# Patient Record
Sex: FEMALE | HISPANIC OR LATINO | Employment: UNEMPLOYED | URBAN - METROPOLITAN AREA
[De-identification: names, ages, dates, MRNs, and addresses within clinical notes are randomized per-mention and may not be internally consistent; named-entity substitution may affect disease eponyms.]

---

## 2017-01-16 ENCOUNTER — HOSPITAL ENCOUNTER (EMERGENCY)
Facility: HOSPITAL | Age: 32
Discharge: HOME | End: 2017-01-16
Attending: EMERGENCY MEDICINE
Payer: MEDICAID

## 2017-01-16 VITALS
DIASTOLIC BLOOD PRESSURE: 61 MMHG | TEMPERATURE: 98 F | SYSTOLIC BLOOD PRESSURE: 107 MMHG | OXYGEN SATURATION: 100 % | BODY MASS INDEX: 23.05 KG/M2 | WEIGHT: 135 LBS | RESPIRATION RATE: 20 BRPM | HEIGHT: 64 IN | HEART RATE: 77 BPM

## 2017-01-16 DIAGNOSIS — R11.0 NAUSEA: ICD-10-CM

## 2017-01-16 DIAGNOSIS — Z34.91 FIRST TRIMESTER PREGNANCY: Primary | ICD-10-CM

## 2017-01-16 LAB
B-HCG UR QL: POSITIVE
CTP QC/QA: YES

## 2017-01-16 PROCEDURE — 81025 URINE PREGNANCY TEST: CPT | Performed by: EMERGENCY MEDICINE

## 2017-01-16 PROCEDURE — 99283 EMERGENCY DEPT VISIT LOW MDM: CPT | Mod: 25

## 2017-01-16 RX ORDER — ONDANSETRON 4 MG/1
8 TABLET, ORALLY DISINTEGRATING ORAL EVERY 8 HOURS PRN
Qty: 12 TABLET | Refills: 1 | Status: SHIPPED | OUTPATIENT
Start: 2017-01-16 | End: 2018-07-07

## 2017-01-16 NOTE — ED PROVIDER NOTES
Encounter Date: 1/16/2017    SCRIBE #1 NOTE: I, Andrew Kingston, am scribing for, and in the presence of,  Jamie Herrera MD. I have scribed the following portions of the note - Other sections scribed: HPI, ROS.       History     Chief Complaint   Patient presents with    Nausea     Sts has nausea and is one month pregnant. Denies abd pain or vag d/c.     Review of patient's allergies indicates:  No Known Allergies  HPI Comments: CC: Nausea secondary to pregnancy     HPI: This 31 y.o. Female with no pertinent PMHx presents to the ED c/o nausea. Pt states she is 1 month pregnant due to a positive at home pregnancy test but wants to confirm and verify how far along she is. Pt denies emesis, vaginal d/c, vaginal bleeding, lower back pain, abdominal pain or any other associated symptoms.     The history is provided by the patient and the spouse.     History reviewed. No pertinent past medical history.  Past Medical History Pertinent Negatives   Diagnosis Date Noted    Arthritis 1/16/2017    Asthma 1/16/2017    Cancer 1/16/2017    CHF (congestive heart failure) 1/16/2017    Depression 1/16/2017    Diabetes mellitus 1/16/2017    Encounter for blood transfusion 1/16/2017    GERD (gastroesophageal reflux disease) 1/16/2017    Hypertension 1/16/2017     Past Surgical History   Procedure Laterality Date    Breast implants       History reviewed. No pertinent family history.  Social History   Substance Use Topics    Smoking status: Never Smoker    Smokeless tobacco: None    Alcohol use Yes      Comment: sometimes     Review of Systems   Constitutional: Negative for fever.   HENT: Negative for sore throat.    Eyes: Negative for photophobia.   Respiratory: Negative for shortness of breath.    Cardiovascular: Negative for chest pain.   Gastrointestinal: Positive for nausea. Negative for vomiting.   Genitourinary: Negative for dysuria, vaginal bleeding and vaginal discharge.   Musculoskeletal: Negative for back pain.    Skin: Negative for rash.   Neurological: Negative for weakness.       Physical Exam   Initial Vitals   BP Pulse Resp Temp SpO2   01/16/17 1044 01/16/17 1044 01/16/17 1044 01/16/17 1044 01/16/17 1044   104/59 64 18 98.4 °F (36.9 °C) 100 %     Physical Exam    Nursing note and vitals reviewed.  HENT:   Head: Atraumatic.   Abdominal: Soft. She exhibits no distension. There is no tenderness. There is no rebound.   Neurological: She is alert and oriented to person, place, and time.   Psychiatric: She has a normal mood and affect.         ED Course   Procedures  Labs Reviewed   POCT URINE PREGNANCY - Abnormal; Notable for the following:        Result Value    POC Preg Test, Ur Positive (*)     All other components within normal limits             Medical Decision Making:   Initial Assessment:   31-year-old female presenting with nausea.  Patient says she had a positive pregnancy test at home.  Denies abdominal pain, vaginal bleeding or discharge.  Exam is unremarkable.  Abdomen benign.  UPT positive.  Patient will be discharged home with prenatal vitamins, Zofran and follow up with OB.  Return instructions given.  Patient verbalized understanding and agreement with plan.            Scribe Attestation:   Scribe #1: I performed the above scribed service and the documentation accurately describes the services I performed. I attest to the accuracy of the note.    Attending Attestation:           Physician Attestation for Scribe:  Physician Attestation Statement for Scribe #1: I, Jamie Herrera MD, reviewed documentation, as scribed by Andrew Kingston in my presence, and it is both accurate and complete.                 ED Course     Clinical Impression:   The primary encounter diagnosis was First trimester pregnancy. A diagnosis of Nausea was also pertinent to this visit.          Jamie Herrera MD  01/16/17 1274

## 2017-01-16 NOTE — ED AVS SNAPSHOT
[facility]  [place] [address]               Aamir Upton   2017 11:23 AM   ED    Description:  Female : 1985   Department:  [facility]           Your Care was Coordinated By:     Provider Role From To    Jamie Herrera MD Attending Provider 17 1123 --      Reason for Visit     Nausea           Diagnoses this Visit        Comments    First trimester pregnancy    -  Primary     Nausea           ED Disposition     ED Disposition Condition Comment    Discharge             To Do List           Follow-up Information     Follow up with Luis Fernando Portillo MD. Schedule an appointment as soon as possible for a visit in 1 week.    Specialty:  Obstetrics and Gynecology    Contact information:    [address]  [address]  [place] [address]  [phone]         These Medications        Disp Refills Start End    ondansetron (ZOFRAN-ODT) 4 MG TbDL 12 tablet 1 2017     Take 2 tablets (8 mg total) by mouth every 8 (eight) hours as needed. - Oral    PNV cmb#21-iron-folic acid (PRENATAL COMPLETE)  mg-mcg Tab 30 tablet 0 2017    Take 1 tablet by mouth once daily. - Oral      [facility]     [facility] -  Assistance  Registered nurses in the [facility] provide clinical advisement, health education, appointment booking, and other advisory services.  Call for this free service at [phone].             Medications           Message regarding Medications     Verify the changes and/or additions to your medication regime listed below are the same as discussed with your clinician today.  If any of these changes or additions are incorrect, please notify your healthcare provider.        START taking these NEW medications        Refills    ondansetron (ZOFRAN-ODT) 4 MG TbDL 1    Sig: Take 2 tablets (8 mg total) by mouth every 8 (eight) hours as needed.    Class: Print     "Route: Oral    PNV cmb#21-iron-folic acid (PRENATAL COMPLETE)  mg-mcg Tab 0    Sig: Take 1 tablet by mouth once daily.    Class: Print    Route: Oral           Verify that the below list of medications is an accurate representation of the medications you are currently taking.  If none reported, the list may be blank. If incorrect, please contact your healthcare provider. Carry this list with you in case of emergency.           Current Medications     ondansetron (ZOFRAN-ODT) 4 MG TbDL Take 2 tablets (8 mg total) by mouth every 8 (eight) hours as needed.    PNV cmb#21-iron-folic acid (PRENATAL COMPLETE)  mg-mcg Tab Take 1 tablet by mouth once daily.    PNV95/FERROUS FUMARATE/FA (PRENATAL ORAL) Take by mouth.           Clinical Reference Information           Your Vitals Were     BP Pulse Temp Resp Height Weight    104/59 (BP Location: Left arm, Patient Position: Sitting) 64 98.4 °F (36.9 °C) (Oral) 18 5' 4" (1.626 m) 61.2 kg (135 lb)    Last Period SpO2 BMI          11/28/2016 100% 23.17 kg/m2        Allergies as of 1/16/2017     No Known Allergies      Immunizations Administered on Date of Encounter - 1/16/2017     None      ED Micro, Lab, POCT     Start Ordered       Status Ordering Provider    01/16/17 1050 01/16/17 1049  POCT urine pregnancy  Once      Final result       ED Imaging Orders     None      Discharge References/Attachments     PREGNANCY: YOUR FIRST TRIMESTER CHANGES (ENGLISH)       [facility] complies with applicable Federal civil rights laws and does not discriminate on the basis of race, color, national origin, age, disability, or sex.        Language Assistance Services     ATTENTION: Language assistance services are available, free of charge. Please call [phone].      ATENCIÓN: Si habla español, tiene a dunlap disposición servicios gratuitos de asistencia lingüística. Llame al [phone].     CHÚ Ý: N?u b?n nói Ti?ng Vi?t, có các d?ch v? h? tr? ngôn ng? mi?n " phí dành cho b?keyanna. G?i s? [phone].

## 2017-03-13 ENCOUNTER — HOSPITAL ENCOUNTER (EMERGENCY)
Facility: HOSPITAL | Age: 32
Discharge: HOME | End: 2017-03-13
Attending: EMERGENCY MEDICINE
Payer: MEDICAID

## 2017-03-13 VITALS
OXYGEN SATURATION: 97 % | HEART RATE: 80 BPM | DIASTOLIC BLOOD PRESSURE: 62 MMHG | RESPIRATION RATE: 16 BRPM | HEIGHT: 65 IN | WEIGHT: 140 LBS | BODY MASS INDEX: 23.32 KG/M2 | TEMPERATURE: 98 F | SYSTOLIC BLOOD PRESSURE: 98 MMHG

## 2017-03-13 DIAGNOSIS — R56.9 SEIZURE-LIKE ACTIVITY: ICD-10-CM

## 2017-03-13 DIAGNOSIS — O99.891 BACTERIURIA IN PREGNANCY: ICD-10-CM

## 2017-03-13 DIAGNOSIS — F10.929 ALCOHOL INTOXICATION, WITH UNSPECIFIED COMPLICATION: Primary | ICD-10-CM

## 2017-03-13 DIAGNOSIS — R82.71 BACTERIURIA IN PREGNANCY: ICD-10-CM

## 2017-03-13 LAB
ALBUMIN SERPL BCP-MCNC: 3.5 G/DL
ALP SERPL-CCNC: 35 U/L
ALT SERPL W/O P-5'-P-CCNC: 11 U/L
AMPHET+METHAMPHET UR QL: NEGATIVE
ANION GAP SERPL CALC-SCNC: 9 MMOL/L
AST SERPL-CCNC: 18 U/L
BACTERIA #/AREA URNS HPF: ABNORMAL /HPF
BARBITURATES UR QL SCN>200 NG/ML: NEGATIVE
BASOPHILS # BLD AUTO: 0.03 K/UL
BASOPHILS NFR BLD: 0.4 %
BENZODIAZ UR QL SCN>200 NG/ML: NEGATIVE
BILIRUB SERPL-MCNC: 0.2 MG/DL
BILIRUB UR QL STRIP: NEGATIVE
BUN SERPL-MCNC: 6 MG/DL
BZE UR QL SCN: NEGATIVE
CALCIUM SERPL-MCNC: 8.5 MG/DL
CANNABINOIDS UR QL SCN: NEGATIVE
CHLORIDE SERPL-SCNC: 109 MMOL/L
CLARITY UR: ABNORMAL
CO2 SERPL-SCNC: 23 MMOL/L
COLOR UR: YELLOW
CREAT SERPL-MCNC: 0.6 MG/DL
CREAT UR-MCNC: 82.7 MG/DL
DIFFERENTIAL METHOD: ABNORMAL
EOSINOPHIL # BLD AUTO: 0 K/UL
EOSINOPHIL NFR BLD: 0.4 %
ERYTHROCYTE [DISTWIDTH] IN BLOOD BY AUTOMATED COUNT: 12.8 %
EST. GFR  (AFRICAN AMERICAN): >60 ML/MIN/1.73 M^2
EST. GFR  (NON AFRICAN AMERICAN): >60 ML/MIN/1.73 M^2
ETHANOL SERPL-MCNC: 109 MG/DL
GLUCOSE SERPL-MCNC: 95 MG/DL
GLUCOSE UR QL STRIP: NEGATIVE
HCT VFR BLD AUTO: 34.3 %
HGB BLD-MCNC: 11.9 G/DL
HGB UR QL STRIP: NEGATIVE
KETONES UR QL STRIP: NEGATIVE
LEUKOCYTE ESTERASE UR QL STRIP: NEGATIVE
LYMPHOCYTES # BLD AUTO: 1.2 K/UL
LYMPHOCYTES NFR BLD: 17.6 %
MCH RBC QN AUTO: 31.1 PG
MCHC RBC AUTO-ENTMCNC: 34.7 %
MCV RBC AUTO: 90 FL
METHADONE UR QL SCN>300 NG/ML: NEGATIVE
MICROSCOPIC COMMENT: ABNORMAL
MONOCYTES # BLD AUTO: 0.5 K/UL
MONOCYTES NFR BLD: 7.6 %
NEUTROPHILS # BLD AUTO: 5.1 K/UL
NEUTROPHILS NFR BLD: 73.9 %
NITRITE UR QL STRIP: NEGATIVE
OPIATES UR QL SCN: NEGATIVE
PCP UR QL SCN>25 NG/ML: NEGATIVE
PH UR STRIP: 6 [PH] (ref 5–8)
PLATELET # BLD AUTO: 226 K/UL
PMV BLD AUTO: 10 FL
POTASSIUM SERPL-SCNC: 4.3 MMOL/L
PROT SERPL-MCNC: 6.8 G/DL
PROT UR QL STRIP: NEGATIVE
RBC # BLD AUTO: 3.83 M/UL
SODIUM SERPL-SCNC: 141 MMOL/L
SP GR UR STRIP: 1.02 (ref 1–1.03)
SQUAMOUS #/AREA URNS HPF: 4 /HPF
TOXICOLOGY INFORMATION: NORMAL
URN SPEC COLLECT METH UR: ABNORMAL
UROBILINOGEN UR STRIP-ACNC: NEGATIVE EU/DL
WBC # BLD AUTO: 6.93 K/UL
WBC #/AREA URNS HPF: 1 /HPF (ref 0–5)

## 2017-03-13 PROCEDURE — 25000003 PHARM REV CODE 250: Performed by: EMERGENCY MEDICINE

## 2017-03-13 PROCEDURE — 80053 COMPREHEN METABOLIC PANEL: CPT

## 2017-03-13 PROCEDURE — 96361 HYDRATE IV INFUSION ADD-ON: CPT

## 2017-03-13 PROCEDURE — 87086 URINE CULTURE/COLONY COUNT: CPT

## 2017-03-13 PROCEDURE — 99284 EMERGENCY DEPT VISIT MOD MDM: CPT | Mod: 25

## 2017-03-13 PROCEDURE — 85025 COMPLETE CBC W/AUTO DIFF WBC: CPT

## 2017-03-13 PROCEDURE — 82570 ASSAY OF URINE CREATININE: CPT

## 2017-03-13 PROCEDURE — 80320 DRUG SCREEN QUANTALCOHOLS: CPT

## 2017-03-13 PROCEDURE — 81000 URINALYSIS NONAUTO W/SCOPE: CPT

## 2017-03-13 PROCEDURE — 96360 HYDRATION IV INFUSION INIT: CPT

## 2017-03-13 RX ORDER — NITROFURANTOIN 25; 75 MG/1; MG/1
100 CAPSULE ORAL ONCE
Status: COMPLETED | OUTPATIENT
Start: 2017-03-13 | End: 2017-03-13

## 2017-03-13 RX ORDER — NITROFURANTOIN 25; 75 MG/1; MG/1
100 CAPSULE ORAL 2 TIMES DAILY
Qty: 14 CAPSULE | Refills: 0 | Status: SHIPPED | OUTPATIENT
Start: 2017-03-13 | End: 2017-03-20

## 2017-03-13 RX ADMIN — SODIUM CHLORIDE 1000 ML: 0.9 INJECTION, SOLUTION INTRAVENOUS at 02:03

## 2017-03-13 RX ADMIN — SODIUM CHLORIDE 1000 ML: 0.9 INJECTION, SOLUTION INTRAVENOUS at 04:03

## 2017-03-13 RX ADMIN — NITROFURANTOIN MONOHYDRATE AND NITROFURANTOIN MACROCRYSTALLINE 100 MG: 75; 25 CAPSULE ORAL at 04:03

## 2017-03-13 NOTE — ED AVS SNAPSHOT
[facility]  [place] [address]               Aamir Upton   3/13/2017  1:16 AM   ED    Description:  Female : 1985   Department:  [facility]           Your Care was Coordinated By:     Provider Role From To    Prabhakar Puga MD Attending Provider 17 0122 --      Reason for Visit     Alcohol Intoxication           Diagnoses this Visit        Comments    Alcohol intoxication, with unspecified complication    -  Primary     Seizure-like activity         Bacteriuria in pregnancy           ED Disposition     None           To Do List           Follow-up Information     Follow up with Carmelo Tyler MD. Schedule an appointment as soon as possible for a visit today.    Specialty:  Neurology    Contact information:    [address]  [place] [address]  [phone]          Follow up with [facility].    Specialty:  Emergency Medicine    Why:  As needed    Contact information:    [place] [address] [phone]        Please follow up.    Why:  Also follow-up with her OB/GYN.       These Medications        Disp Refills Start End    nitrofurantoin, macrocrystal-monohydrate, (MACROBID) 100 MG capsule 14 capsule 0 3/13/2017 3/20/2017    Take 1 capsule (100 mg total) by mouth 2 (two) times daily. - Oral    Pharmacy: [facility] [address] [place] [address] [address] AT [facility] #: [phone]         [facility]     [facility] Care [facility] -  Assistance  Registered nurses in the [facility] provide clinical advisement, health education, appointment booking, and other advisory services.  Call for this free service at [phone].             Medications           Message regarding Medications     Verify the changes and/or additions to your medication regime listed below are the same as discussed with your  "clinician today.  If any of these changes or additions are incorrect, please notify your healthcare provider.        START taking these NEW medications        Refills    nitrofurantoin, macrocrystal-monohydrate, (MACROBID) 100 MG capsule 0    Sig: Take 1 capsule (100 mg total) by mouth 2 (two) times daily.    Class: Print    Route: Oral      These medications were administered today        Dose Freq    sodium chloride 0.9% bolus 1,000 mL 1,000 mL ED 1 Time    Sig: Inject 1,000 mLs into the vein ED 1 Time.    Class: Normal    Route: Intravenous    sodium chloride 0.9% bolus 1,000 mL 1,000 mL ED 1 Time    Sig: Inject 1,000 mLs into the vein ED 1 Time.    Class: Normal    Route: Intravenous    nitrofurantoin (macrocrystal-monohydrate) 100 MG capsule 100 mg 100 mg Once    Sig: Take 1 capsule (100 mg total) by mouth once.    Class: Normal    Route: Oral           Verify that the below list of medications is an accurate representation of the medications you are currently taking.  If none reported, the list may be blank. If incorrect, please contact your healthcare provider. Carry this list with you in case of emergency.           Current Medications     PNV with Ca,no.74-iron-FA (PRENAPLUS) 27-1 mg Tab Take 1 tablet by mouth once daily.    nitrofurantoin, macrocrystal-monohydrate, (MACROBID) 100 MG capsule Take 1 capsule (100 mg total) by mouth 2 (two) times daily.    ondansetron (ZOFRAN-ODT) 4 MG TbDL Take 2 tablets (8 mg total) by mouth every 8 (eight) hours as needed.    PNV cmb#21-iron-folic acid (PRENATAL COMPLETE)  mg-mcg Tab Take 1 tablet by mouth once daily.    promethazine (PHENERGAN) 25 MG tablet Take 1 tablet (25 mg total) by mouth every 6 (six) hours as needed for Nausea.           Clinical Reference Information           Your Vitals Were     BP Pulse Temp Resp Height Weight    95/52 66 98.3 °F (36.8 °C) 16 5' 5" (1.651 m) 63.5 kg (140 lb)    Last Period SpO2 BMI          11/28/2016 97% 23.3 kg/m2      "   Allergies as of 3/13/2017     No Known Allergies      Immunizations Administered on Date of Encounter - 3/13/2017     None      ED Micro, Lab, POCT     Start Ordered       Status Ordering Provider    03/13/17 0430 03/13/17 0430  Urine culture **CANNOT BE ORDERED STAT**  Once      In process     03/13/17 0208 03/13/17 0207  Ethanol  STAT      Final result     03/13/17 0208 03/13/17 0207  Urinalysis  STAT      Final result     03/13/17 0208 03/13/17 0207  Drug screen panel, emergency  STAT      Final result     03/13/17 0208 03/13/17 0207  CBC auto differential  Once      Final result     03/13/17 0208 03/13/17 0207  Comprehensive metabolic panel  Once      Final result     03/13/17 0207 03/13/17 0207  Urinalysis Microscopic  Once      Final result       ED Imaging Orders     None      Discharge References/Attachments     ALCOHOL INTOXICATION (ENGLISH)    SEIZURE, NEW ONSET, UNKNOWN CAUSE (ADULT) (ENGLISH)      Your Scheduled Appointments     Mar 15, 2017 11:15 AM CDT   Repeat OB Visit-OCC with Apoorva Ji MD   [facility] ([facility])    [address] [place] [address]   [phone]               [facility] complies with applicable Federal civil rights laws and does not discriminate on the basis of race, color, national origin, age, disability, or sex.        Language Assistance Services     ATTENTION: Language assistance services are available, free of charge. Please call [phone].      ATENCIÓN: Si habla español, tiene a dunlap disposición servicios gratuitos de asistencia lingüística. Llame al [phone].     CHÚ Ý: N?u b?n nói Ti?ng Vi?t, có các d?ch v? h? tr? ngôn ng? mi?n phí dành cho b?n. G?i s? [phone].

## 2017-03-13 NOTE — ED TRIAGE NOTES
"Patient is 15 weeks pregnant and presents with alcohol intoxication. Patient states that she went out with friends and "made a mistake." Patient states that she drank two large drinks with vodka. Patient states this is her first time drinking during her pregnancy. Patient appears clinically intoxicated at this time.   "

## 2017-03-13 NOTE — ED PROVIDER NOTES
Encounter Date: 3/13/2017    SCRIBE #1 NOTE: I, Maria D Morocho, am scribing for, and in the presence of,  Prabhakar Puga MD. I have scribed the following portions of the note - Other sections scribed: HPI, ROS.       History     Chief Complaint   Patient presents with    Alcohol Intoxication     PATIENT 15 WEEKS PREGNANT, GOT INTOXICATED TONIGHT. S.O. CALLED EMS BECAUSE PATIENT'S ARMS WERE SHAKING. A/A/O, VSS, NO S/S OF ACUTE DISTRESS NOTED.      Review of patient's allergies indicates:  No Known Allergies  HPI Comments: CC: Alcohol Intoxication    HPI: 31 year old pregnant female (13 weeks) with no known PMHx presents to the ED via EMS for emergent evaluation s/p alcohol intoxication. Patient reports drinking 1.5 bottles of wine yesterday after an argument with her .  says patient arrived home last night, and when he put her in bed she started shaking for about 30 seconds and was unresponsive. Patient attributes the shaking to being cold, but does not remember her  asking if she was okay. Patient states her next OB/GYN appointment is 3/15/17, and thinks she is 13 weeks pregnant, but is unsure. Patient denies a hx of drug use, hx of seizures, suicidal ideation, vomiting and other symptoms.          The history is provided by the patient and the spouse. No  was used.     History reviewed. No pertinent past medical history.  Past Surgical History:   Procedure Laterality Date    breast implants      BREAST SURGERY       Family History   Problem Relation Age of Onset    Breast cancer Maternal Aunt     Colon cancer Neg Hx     Ovarian cancer Neg Hx      Social History   Substance Use Topics    Smoking status: Never Smoker    Smokeless tobacco: None    Alcohol use Yes      Comment: sometimes     Review of Systems   Constitutional: Positive for chills.   HENT: Negative for rhinorrhea and sore throat.    Eyes: Negative for pain and visual disturbance.   Respiratory:  Negative for cough and shortness of breath.    Cardiovascular: Negative for chest pain.   Gastrointestinal: Negative for abdominal pain, diarrhea, nausea and vomiting.   Genitourinary: Negative for dysuria.   Musculoskeletal: Negative for back pain and neck pain.   Skin: Negative for rash.   Neurological: Negative for dizziness and headaches.   Psychiatric/Behavioral: Negative for suicidal ideas.       Physical Exam   Initial Vitals   BP Pulse Resp Temp SpO2   03/13/17 0105 03/13/17 0105 03/13/17 0105 03/13/17 0105 03/13/17 0105   112/88 80 16 98.3 °F (36.8 °C) 99 %     Physical Exam    ED Course   Procedures  Labs Reviewed   ALCOHOL,MEDICAL (ETHANOL) - Abnormal; Notable for the following:        Result Value    Alcohol, Medical, Serum 109 (*)     All other components within normal limits   URINALYSIS - Abnormal; Notable for the following:     Appearance, UA Hazy (*)     All other components within normal limits   CBC W/ AUTO DIFFERENTIAL - Abnormal; Notable for the following:     RBC 3.83 (*)     Hemoglobin 11.9 (*)     Hematocrit 34.3 (*)     MCH 31.1 (*)     Gran% 73.9 (*)     Lymph% 17.6 (*)     All other components within normal limits   COMPREHENSIVE METABOLIC PANEL - Abnormal; Notable for the following:     Calcium 8.5 (*)     Alkaline Phosphatase 35 (*)     All other components within normal limits   URINALYSIS MICROSCOPIC - Abnormal; Notable for the following:     Bacteria, UA Moderate (*)     All other components within normal limits   CULTURE, URINE   DRUG SCREEN PANEL, URINE EMERGENCY            patient reportedly had a argument with person a few other this morning.  Due to being upset she drank some wine.  She started to feel very cold and what felt like she was shivering.  Significant other states she was having seizure-like activity.  She feels she remembers the entire event.  He states there was a period time where she did not respond to him.  No history of seizure disorder.  No report of head  trauma.  No headache.  There was no biting of the tongue or loss of urine or bowel.  Occurred while patient was lying on the couch.  No trauma.  Patient states she now feels well.  Unclear whether this is actual seizure activity or not.  Patient has not been having headaches or any other neurologic problems.  Patient has a normal neurologic exam this time.  Normal vital signs except for lead pressure in the 90s over 50s.  Patient is a young woman who is relatively thin.  She is unsure what her normal blood pressures are.  She is asymptomatic of any low blood pressure symptomatology.  Patient's alcohol level was 109.  No other drugs.  Electrolytes normal.  Blood sugar normal.  Patient is pregnant.  Reportedly 13 weeks.  Fetal heart tones assessed and 168.  Patient denies any pelvic pain or vaginal bleeding.  Urinalysis does show bacteriuria without other signs of infection.  Due to pregnancy we'll treat with Macrobid.  Culture pending.  Discussed head CT with the patient and significant other regarding potential new onset seizure activity.  They're concerned about radiation to the fetus.  I did say that I was unable to shield fetus from radiation of the CAT scan but with radiation focused on the head the amount of radiation to the pelvis with likely not significant.  I cannot guarantee that they would not have radiation exposure however.  Patient wants another declining head CT.  With seizure activity not confirmed and the story for seizure somewhat suspect as the nursing home nurse states the entire episode lasted only approximately 30 seconds start to finish including recovery time, will not push for headache imaging at this time.  We'll have the patient follow-up with neurology for outpatient MRI and EEG is warranted.  Patient is aware of this and agrees to this plan.  We will recommend seizure precautions.  Patient has been observed in the emergency department with no seizure activity.  Patient feels well and at  baseline.  Patient is not clinically intoxicated.  Stable for discharge.             Scribe Attestation:   Scribe #1: I performed the above scribed service and the documentation accurately describes the services I performed. I attest to the accuracy of the note.    Attending Attestation:           Physician Attestation for Scribe:  Physician Attestation Statement for Scribe #1: I, Prabhakar Puga MD, reviewed documentation, as scribed by Maria D Morocho in my presence, and it is both accurate and complete.                 ED Course     Clinical Impression:   The primary encounter diagnosis was Alcohol intoxication, with unspecified complication. Diagnoses of Seizure-like activity and Bacteriuria in pregnancy were also pertinent to this visit.          Prabhakar Puga MD  03/13/17 7569

## 2017-03-15 LAB — BACTERIA UR CULT: NORMAL

## 2017-05-23 ENCOUNTER — LAB VISIT (OUTPATIENT)
Dept: LAB | Facility: HOSPITAL | Age: 32
End: 2017-05-23
Attending: OBSTETRICS & GYNECOLOGY
Payer: MEDICAID

## 2017-05-23 DIAGNOSIS — Z34.82 PRENATAL CARE, SUBSEQUENT PREGNANCY, SECOND TRIMESTER: Primary | ICD-10-CM

## 2017-05-23 LAB — GLUCOSE SERPL-MCNC: 85 MG/DL

## 2017-05-23 PROCEDURE — 82950 GLUCOSE TEST: CPT

## 2017-05-23 PROCEDURE — 36415 COLL VENOUS BLD VENIPUNCTURE: CPT

## 2018-07-07 ENCOUNTER — HOSPITAL ENCOUNTER (EMERGENCY)
Facility: HOSPITAL | Age: 33
Discharge: HOME | End: 2018-07-07
Attending: EMERGENCY MEDICINE
Payer: MEDICAID

## 2018-07-07 VITALS
RESPIRATION RATE: 18 BRPM | OXYGEN SATURATION: 98 % | DIASTOLIC BLOOD PRESSURE: 64 MMHG | TEMPERATURE: 98 F | BODY MASS INDEX: 22.99 KG/M2 | HEART RATE: 68 BPM | HEIGHT: 65 IN | WEIGHT: 138 LBS | SYSTOLIC BLOOD PRESSURE: 101 MMHG

## 2018-07-07 DIAGNOSIS — R51.9 NONINTRACTABLE HEADACHE, UNSPECIFIED CHRONICITY PATTERN, UNSPECIFIED HEADACHE TYPE: Primary | ICD-10-CM

## 2018-07-07 LAB
B-HCG UR QL: NEGATIVE
CTP QC/QA: YES

## 2018-07-07 PROCEDURE — 81025 URINE PREGNANCY TEST: CPT | Performed by: NURSE PRACTITIONER

## 2018-07-07 PROCEDURE — 63600175 PHARM REV CODE 636 W HCPCS: Performed by: NURSE PRACTITIONER

## 2018-07-07 PROCEDURE — 96372 THER/PROPH/DIAG INJ SC/IM: CPT | Performed by: NURSE PRACTITIONER

## 2018-07-07 PROCEDURE — 99284 EMERGENCY DEPT VISIT MOD MDM: CPT | Mod: 25 | Performed by: NURSE PRACTITIONER

## 2018-07-07 RX ORDER — KETOROLAC TROMETHAMINE 30 MG/ML
30 INJECTION, SOLUTION INTRAMUSCULAR; INTRAVENOUS
Status: COMPLETED | OUTPATIENT
Start: 2018-07-07 | End: 2018-07-07

## 2018-07-07 RX ORDER — METOCLOPRAMIDE HYDROCHLORIDE 5 MG/ML
10 INJECTION INTRAMUSCULAR; INTRAVENOUS
Status: COMPLETED | OUTPATIENT
Start: 2018-07-07 | End: 2018-07-07

## 2018-07-07 RX ORDER — BUTALBITAL, ACETAMINOPHEN AND CAFFEINE 50; 325; 40 MG/1; MG/1; MG/1
1 TABLET ORAL EVERY 4 HOURS PRN
Qty: 20 TABLET | Refills: 0 | Status: SHIPPED | OUTPATIENT
Start: 2018-07-07 | End: 2018-08-06

## 2018-07-07 RX ADMIN — METOCLOPRAMIDE 10 MG: 5 INJECTION, SOLUTION INTRAMUSCULAR; INTRAVENOUS at 11:07

## 2018-07-07 RX ADMIN — KETOROLAC TROMETHAMINE 30 MG: 30 INJECTION, SOLUTION INTRAMUSCULAR at 11:07

## 2018-07-07 NOTE — ED PROVIDER NOTES
Encounter Date: 7/7/2018     This is a 32 y.o. female complaining of occipital headache that began several days ago. She states that she has not slept for the past 2 nights due to the pain. She reports associated dizziness.    I have evaluated and conducted a medical screening exam with initial orders entered, if indicated, to expedite care. The patient will be placed in a treatment area when one is available. Care will be transferred to an alternate provider for a full assessment including but not limited to: history, physical exam, additional orders, and final disposition.    Doroteo Benjamin NP         History   No chief complaint on file.    32-year-old female presents with left-sided headache that she states radiates to her neck x2 weeks.  Patient states the pain has gotten significantly worse over the past 2 days.  She reports taking Motrin over the past 2 weeks with some improvement.  Patient also reports that she is having difficulty sleeping secondary to stress in her life and the headache. She denies any homicidal or suicidal ideations.  Patient states that her sister was recently diagnosed with a brain tumor and she wants a x-ray of her head.  8/10 pain at present.          Review of patient's allergies indicates:  No Known Allergies  No past medical history on file.  Past Surgical History:   Procedure Laterality Date    breast implants      BREAST SURGERY       Family History   Problem Relation Age of Onset    Breast cancer Maternal Aunt     Colon cancer Neg Hx     Ovarian cancer Neg Hx      Social History   Substance Use Topics    Smoking status: Never Smoker    Smokeless tobacco: Not on file    Alcohol use Yes      Comment: sometimes     Review of Systems   Constitutional: Negative for fever.   HENT: Negative for sore throat.    Respiratory: Negative for shortness of breath.    Cardiovascular: Negative for chest pain.   Gastrointestinal: Negative for nausea.   Genitourinary: Negative for  dysuria.   Musculoskeletal: Negative for back pain.   Skin: Negative for rash.   Neurological: Positive for headaches. Negative for weakness.   Hematological: Does not bruise/bleed easily.       Physical Exam     Initial Vitals   BP Pulse Resp Temp SpO2   -- -- -- -- --      MAP       --         Physical Exam    Nursing note and vitals reviewed.  Constitutional: She appears well-developed and well-nourished. No distress.   HENT:   Head: Normocephalic.   Mouth/Throat: Oropharynx is clear and moist.   Eyes: Conjunctivae are normal. Pupils are equal, round, and reactive to light.   Neck: Normal range of motion. Neck supple.   Cardiovascular: Normal rate, regular rhythm and normal heart sounds.   Pulmonary/Chest: Breath sounds normal.   Abdominal: Soft.   Musculoskeletal: Normal range of motion.   Neurological: She is alert and oriented to person, place, and time. She has normal strength and normal reflexes. She displays normal reflexes. No cranial nerve deficit or sensory deficit.   5/5 strength throughout  DTR WNL  Negative ataxia, normal finger-to-nose.   Skin: Skin is warm and dry. Capillary refill takes less than 2 seconds.         ED Course   Procedures  Labs Reviewed - No data to display       Imaging Results    None          Medical Decision Making:   Initial Assessment:   32-year-old female presents with intermittent headache x2 weeks  Differential Diagnosis:   Tension headache, migraine headache, brain mass  ED Management:  Diagnosis management comments: This is an urgent evaluation of a 32-year-old female that presented to the ER with c/o intermittent headache x2 weeks. Pts exam was as above.     CT was reviewed and discussed with pt.  No acute finding    Injection of Toradol and Reglan given.  Patient states headache is greatly improved upon reassessment rating at a 3/10 at present.  I will discharge patient with Fioricet and have encouraged her to follow up with her primary care provider    Based on exam  today - I have low suspicion for medical, surgical or other life threatening condition and I believe pt is safe for discharge and outpatient f/u.    Pt verbalizes understanding of d/c instructions and will return for worsening condition.    Case discussed with attending who agrees with assessment and plan.                         Clinical Impression:   The encounter diagnosis was Nonintractable headache, unspecified chronicity pattern, unspecified headache type.      Disposition:   Disposition: Discharged  Condition: Stable                        Lanie Lemons NP  07/07/18 7249

## 2018-07-07 NOTE — ED TRIAGE NOTES
"C/o headache x 2 weeks. Has taken Advil PM with minimal relief. "I have not been able to sleep, I have insomnia". NADN. Rates pain 7/10.   "

## 2018-12-02 ENCOUNTER — HOSPITAL ENCOUNTER (EMERGENCY)
Facility: HOSPITAL | Age: 33
Discharge: HOME | End: 2018-12-03
Attending: EMERGENCY MEDICINE
Payer: OTHER GOVERNMENT

## 2018-12-02 VITALS
TEMPERATURE: 98 F | WEIGHT: 138 LBS | BODY MASS INDEX: 22.99 KG/M2 | RESPIRATION RATE: 16 BRPM | DIASTOLIC BLOOD PRESSURE: 63 MMHG | HEIGHT: 65 IN | SYSTOLIC BLOOD PRESSURE: 104 MMHG | OXYGEN SATURATION: 98 % | HEART RATE: 62 BPM

## 2018-12-02 DIAGNOSIS — R53.1 WEAKNESS: ICD-10-CM

## 2018-12-02 LAB
ALBUMIN SERPL BCP-MCNC: 4.2 G/DL
ALP SERPL-CCNC: 47 U/L
ALT SERPL W/O P-5'-P-CCNC: 12 U/L
ANION GAP SERPL CALC-SCNC: 7 MMOL/L
AST SERPL-CCNC: 16 U/L
B-HCG UR QL: NEGATIVE
BASOPHILS # BLD AUTO: 0.02 K/UL
BASOPHILS NFR BLD: 0.4 %
BILIRUB SERPL-MCNC: 0.2 MG/DL
BILIRUB UR QL STRIP: NEGATIVE
BUN SERPL-MCNC: 15 MG/DL
CALCIUM SERPL-MCNC: 9.5 MG/DL
CHLORIDE SERPL-SCNC: 106 MMOL/L
CK SERPL-CCNC: 146 U/L
CLARITY UR: CLEAR
CO2 SERPL-SCNC: 26 MMOL/L
COLOR UR: COLORLESS
CREAT SERPL-MCNC: 0.7 MG/DL
CTP QC/QA: YES
DIFFERENTIAL METHOD: ABNORMAL
EOSINOPHIL # BLD AUTO: 0.1 K/UL
EOSINOPHIL NFR BLD: 2.5 %
ERYTHROCYTE [DISTWIDTH] IN BLOOD BY AUTOMATED COUNT: 12.8 %
EST. GFR  (AFRICAN AMERICAN): >60 ML/MIN/1.73 M^2
EST. GFR  (NON AFRICAN AMERICAN): >60 ML/MIN/1.73 M^2
GLUCOSE SERPL-MCNC: 102 MG/DL
GLUCOSE UR QL STRIP: NEGATIVE
HCT VFR BLD AUTO: 35.5 %
HGB BLD-MCNC: 12.2 G/DL
HGB UR QL STRIP: NEGATIVE
KETONES UR QL STRIP: NEGATIVE
LEUKOCYTE ESTERASE UR QL STRIP: NEGATIVE
LYMPHOCYTES # BLD AUTO: 1.8 K/UL
LYMPHOCYTES NFR BLD: 31 %
MCH RBC QN AUTO: 31.4 PG
MCHC RBC AUTO-ENTMCNC: 34.4 G/DL
MCV RBC AUTO: 92 FL
MONOCYTES # BLD AUTO: 0.4 K/UL
MONOCYTES NFR BLD: 7.8 %
NEUTROPHILS # BLD AUTO: 3.3 K/UL
NEUTROPHILS NFR BLD: 58.3 %
NITRITE UR QL STRIP: NEGATIVE
PH UR STRIP: 7 [PH] (ref 5–8)
PLATELET # BLD AUTO: 286 K/UL
PMV BLD AUTO: 9.9 FL
POTASSIUM SERPL-SCNC: 3.9 MMOL/L
PROT SERPL-MCNC: 7.2 G/DL
PROT UR QL STRIP: NEGATIVE
RBC # BLD AUTO: 3.88 M/UL
SODIUM SERPL-SCNC: 139 MMOL/L
SP GR UR STRIP: 1 (ref 1–1.03)
URN SPEC COLLECT METH UR: ABNORMAL
UROBILINOGEN UR STRIP-ACNC: NEGATIVE EU/DL
WBC # BLD AUTO: 5.64 K/UL

## 2018-12-02 PROCEDURE — 93005 ELECTROCARDIOGRAM TRACING: CPT

## 2018-12-02 PROCEDURE — 85379 FIBRIN DEGRADATION QUANT: CPT

## 2018-12-02 PROCEDURE — 80053 COMPREHEN METABOLIC PANEL: CPT

## 2018-12-02 PROCEDURE — 81025 URINE PREGNANCY TEST: CPT | Performed by: PHYSICIAN ASSISTANT

## 2018-12-02 PROCEDURE — 85025 COMPLETE CBC W/AUTO DIFF WBC: CPT

## 2018-12-02 PROCEDURE — 84443 ASSAY THYROID STIM HORMONE: CPT

## 2018-12-02 PROCEDURE — 82550 ASSAY OF CK (CPK): CPT

## 2018-12-02 PROCEDURE — 81003 URINALYSIS AUTO W/O SCOPE: CPT

## 2018-12-02 PROCEDURE — 93010 ELECTROCARDIOGRAM REPORT: CPT | Mod: ,,, | Performed by: INTERNAL MEDICINE

## 2018-12-02 PROCEDURE — 99283 EMERGENCY DEPT VISIT LOW MDM: CPT | Mod: 25

## 2018-12-02 RX ORDER — CEFUROXIME AXETIL 500 MG/1
500 TABLET ORAL
COMMUNITY
End: 2019-05-31

## 2018-12-03 LAB
D DIMER PPP IA.FEU-MCNC: 0.22 MG/L FEU
TSH SERPL DL<=0.005 MIU/L-ACNC: 1.96 UIU/ML

## 2018-12-03 NOTE — ED PROVIDER NOTES
"Encounter Date: 12/2/2018    SCRIBE #1 NOTE: ICricket, am scribing for, and in the presence of,  Maria D Edmondson PA-C. I have scribed the following portions of the note - Other sections scribed: HPI, ROS .       History     Chief Complaint   Patient presents with    Weakness     has no complaints at this time; reports yesterday her lips turned "blue" but has since resolved; reports intermittent weakness recently      CC: Lip Discoloration; Weakness    HPI: 33 y.o. Female with no pertinent PMHx presents to ED c/o acute onset lip discoloration and intermittent weakness since yesterday. Patient reports lip discoloration began after sexual intercourse yesterday, but has improved after treating with ice. Patient also reports weakness after prolonged vaginal bleeding. She notes vaginal bleeding began after having an IUD removed on 11/13/18. Patient states she bleed a normal quantity of blood for 5x days, stopped bleeding, and began again for 5x more days. She notes being prescribed and finishing a 10x day dosage of Cefuroxime for a vaginal infection. Patient was told to take this medication until visiting a doctor in [place], but she notes discontinuing medication after 10x days. She also notes she never visited doctor in [place]. Patinet also c/o chest pressure. She denies vaginal discharge or bleeding, fever, SOB, diarrhea, and new sexual products. No other associated symptoms.             The history is provided by the patient. No  was used.     Review of patient's allergies indicates:  No Known Allergies  History reviewed. No pertinent past medical history.  Past Surgical History:   Procedure Laterality Date    breast implants      BREAST SURGERY       Family History   Problem Relation Age of Onset    Breast cancer Maternal Aunt     Colon cancer Neg Hx     Ovarian cancer Neg Hx      Social History     Tobacco Use    Smoking status: Never Smoker   Substance Use Topics    Alcohol " "use: Yes     Comment: sometimes    Drug use: No     Review of Systems   Constitutional: Negative for appetite change and fever.   HENT: Negative for congestion, rhinorrhea and sore throat.         (+) lip discoloration     Eyes: Negative for pain and visual disturbance.   Respiratory: Negative for cough and shortness of breath.    Cardiovascular: Positive for chest pain (chest "pressure").   Gastrointestinal: Negative for abdominal pain, constipation, diarrhea, nausea and vomiting.   Genitourinary: Negative for decreased urine volume, dysuria, menstrual problem, vaginal bleeding, vaginal discharge and vaginal pain.   Musculoskeletal: Negative for gait problem and neck pain.   Skin: Negative for rash.   Neurological: Positive for weakness. Negative for syncope and headaches.   Psychiatric/Behavioral: Negative for confusion.       Physical Exam     Initial Vitals [12/02/18 2214]   BP Pulse Resp Temp SpO2   (!) 120/57 60 16 98.1 °F (36.7 °C) 98 %      MAP       --         Physical Exam    Nursing note and vitals reviewed.  Constitutional: Vital signs are normal. She appears well-developed and well-nourished. She is not diaphoretic. She is cooperative.  Non-toxic appearance. She does not have a sickly appearance. She does not appear ill. No distress.   HENT:   Head: Normocephalic and atraumatic.   Right Ear: External ear normal.   Left Ear: External ear normal.   Nose: Nose normal.   Mouth/Throat: Uvula is midline, oropharynx is clear and moist and mucous membranes are normal. No oropharyngeal exudate.   Lips are without bruising or cyanosis.    Eyes: Conjunctivae, EOM and lids are normal. Pupils are equal, round, and reactive to light.   Neck: Trachea normal, normal range of motion, full passive range of motion without pain and phonation normal. Neck supple.   Cardiovascular: Normal rate, regular rhythm, normal heart sounds and intact distal pulses. Exam reveals no gallop and no friction rub.    No murmur " heard.  Pulmonary/Chest: Effort normal and breath sounds normal. No respiratory distress. She has no decreased breath sounds. She has no wheezes. She has no rhonchi. She has no rales.   Abdominal: Soft. Normal appearance and bowel sounds are normal. She exhibits no distension and no mass. There is no tenderness. There is no rigidity, no rebound and no guarding.   Musculoskeletal: Normal range of motion.   Neurological: She is alert and oriented to person, place, and time. She has normal strength. No cranial nerve deficit or sensory deficit. GCS eye subscore is 4. GCS verbal subscore is 5. GCS motor subscore is 6.   Skin: Skin is warm and dry. Capillary refill takes less than 2 seconds. No rash noted.   Psychiatric: She has a normal mood and affect. Her speech is normal and behavior is normal. Judgment and thought content normal. Cognition and memory are normal.         ED Course   Procedures  Labs Reviewed   CBC W/ AUTO DIFFERENTIAL - Abnormal; Notable for the following components:       Result Value    RBC 3.88 (*)     Hematocrit 35.5 (*)     MCH 31.4 (*)     All other components within normal limits   COMPREHENSIVE METABOLIC PANEL - Abnormal; Notable for the following components:    Alkaline Phosphatase 47 (*)     Anion Gap 7 (*)     All other components within normal limits   URINALYSIS, REFLEX TO URINE CULTURE - Abnormal; Notable for the following components:    Color, UA Colorless (*)     All other components within normal limits    Narrative:     Preferred Collection Type->Urine, Clean Catch   CK   D DIMER, QUANTITATIVE   TSH   POCT URINE PREGNANCY          Imaging Results          X-Ray Chest PA And Lateral (Final result)  Result time 12/02/18 23:38:53    Final result by Lm Jerry MD (12/02/18 23:38:53)                 Impression:      No acute process.      Electronically signed by: Lm Jerry MD  Date:    12/02/2018  Time:    23:38             Narrative:    EXAMINATION:  XR CHEST PA AND  "LATERAL    CLINICAL HISTORY:  Weakness    TECHNIQUE:  PA and lateral views of the chest were performed.    COMPARISON:  None    FINDINGS:  The trachea is unremarkable.  The cardiomediastinal silhouette is within normal limits.  The hilar structures are unremarkable.  The hemidiaphragms are within normal limits.  There is no evidence of free air beneath the hemidiaphragms.  There are no pleural effusions.  There is no evidence of a pneumothorax.  There is no evidence of pneumomediastinum.  No airspace opacities are present.  The osseous structures are unremarkable.  The subcutaneous tissues are within normal limits.                                       APC / Resident Notes:   This is an evaluation of a 33 y.o. female that presents to the Emergency Department for weakness. Patient reports that her lips "turned blue" after oral sex and sexual intercourse yesterday. She also reports intermittent weakness and body aches x several weeks. She reports having IUD removed 3-4 weeks ago, followed by vaginal bleeding x 5 days. She states that the intermittent weakness began during this time. She also complains of left sided chest pressure. She denies syncope, dizziness, fatigue, SOB, abdominal pain, N/V/D/C, urinary symptoms, vaginal symptoms or further complaints.     Normal exam. No cyanosis. Lungs CTA bilaterally. Patient ambulatory. No evidence of anemia or dehydration clinically.     If available, past records have been reviewed. Patient typically has low blood pressure (105/60)  Vitals are reassuring.   Results:   UPT negative  UA unrevealing for infection.  CBC unrevealing for anemia or leukocytosis.  CMP unrevealing for electrolyte abnormality, SERENITY, transaminitis.  .  D dimer 0.22  TSH 1.959  CXR unrevealing for pneumonia, pneumothorax, pleural effusion, rib fracture.  EKG shows normal sinus rhythm.    My overall impression: weakness  DDx: weakness, anemia, dehydration, orthostatic hypotension, hypothyroidism, " pneumonia, pleural effusion, pneumothorax, transient hypoxia  Low suspicion for PE.    ED course: O2 saturation upon ambulation 97-98%. No evidence of orthostatic hypotension. I will recommend close follow-up with PCP and OBGYN. I feel this patient is stable for discharge. ED return precautions given.    The diagnosis and treatment plan have been discussed with the patient. All questions and concerns have been addressed. Patient expressed understanding. An educational information sheet was given to the patient prior to discharge.     This case has been discussed with Dr. Rodriguez and he is in agreement of the diagnosis and treatment plan.       Maria D Edmondson PA-C         Scribe Attestation:   Scribe #1: I performed the above scribed service and the documentation accurately describes the services I performed. I attest to the accuracy of the note.    Attending Attestation:     Physician Attestation Statement for NP/PA:   I discussed this assessment and plan of this patient with the NP/PA, but I did not personally examine the patient. The face to face encounter was performed by the NP/PA.        Physician Attestation for Scribe:  Physician Attestation Statement for Scribe #1: I, Maria D Edmondson PA-C, reviewed documentation, as scribed by Cricket Russell in my presence, and it is both accurate and complete.                    Clinical Impression:   Diagnoses of Weakness and Weakness were pertinent to this visit.      Disposition:   Disposition: Discharged  Condition: Stable                        Maria D Edmondson PA-C  12/03/18 0254       Hema Rodriguez MD  12/03/18 0302

## 2018-12-03 NOTE — DISCHARGE INSTRUCTIONS
Make sure you are drinking plenty of fluids and getting rest.    Follow-up with primary care and OBGYN.    Return to the ER for any concerns.

## 2019-05-31 ENCOUNTER — HOSPITAL ENCOUNTER (EMERGENCY)
Facility: HOSPITAL | Age: 34
Discharge: HOME | End: 2019-05-31
Attending: EMERGENCY MEDICINE
Payer: OTHER GOVERNMENT

## 2019-05-31 VITALS
OXYGEN SATURATION: 99 % | BODY MASS INDEX: 22.66 KG/M2 | DIASTOLIC BLOOD PRESSURE: 69 MMHG | HEIGHT: 65 IN | HEART RATE: 85 BPM | SYSTOLIC BLOOD PRESSURE: 123 MMHG | TEMPERATURE: 99 F | RESPIRATION RATE: 16 BRPM | WEIGHT: 136 LBS

## 2019-05-31 DIAGNOSIS — Z71.1 FEARED COMPLAINT WITHOUT DIAGNOSIS: Primary | ICD-10-CM

## 2019-05-31 PROCEDURE — 99281 EMR DPT VST MAYX REQ PHY/QHP: CPT

## 2019-05-31 NOTE — PROGRESS NOTES
SW spoke with pt, pt explained that her  has been cheating on her and she explained her concerns about her children. She stated she was going to return home and that she called and explained to her  everything that has happened today.  showed up and stated the pt is upset because of an argument that they had, he staed that they were already in marriage counseling. Pt stated she wanted information on counseling, Sw provided. Nurse already contacted DCFS and SO.